# Patient Record
Sex: FEMALE | Race: ASIAN | NOT HISPANIC OR LATINO
[De-identification: names, ages, dates, MRNs, and addresses within clinical notes are randomized per-mention and may not be internally consistent; named-entity substitution may affect disease eponyms.]

---

## 2021-10-22 PROBLEM — Z00.00 ENCOUNTER FOR PREVENTIVE HEALTH EXAMINATION: Status: ACTIVE | Noted: 2021-10-22

## 2021-10-27 ENCOUNTER — NON-APPOINTMENT (OUTPATIENT)
Age: 46
End: 2021-10-27

## 2021-10-27 ENCOUNTER — APPOINTMENT (OUTPATIENT)
Dept: NEUROSURGERY | Facility: CLINIC | Age: 46
End: 2021-10-27
Payer: COMMERCIAL

## 2021-10-27 VITALS
HEIGHT: 63 IN | WEIGHT: 146 LBS | SYSTOLIC BLOOD PRESSURE: 149 MMHG | TEMPERATURE: 97.2 F | BODY MASS INDEX: 25.87 KG/M2 | HEART RATE: 69 BPM | DIASTOLIC BLOOD PRESSURE: 90 MMHG

## 2021-10-27 DIAGNOSIS — M54.12 RADICULOPATHY, CERVICAL REGION: ICD-10-CM

## 2021-10-27 PROCEDURE — 99205 OFFICE O/P NEW HI 60 MIN: CPT

## 2021-10-27 NOTE — ASSESSMENT
[FreeTextEntry1] : Ms. Marroquin has symptoms of cervical radiculopathy b/l however on imaging, shows no significant spinal cord stenosis that would require any acute neurosurgical intervention at this time. I provided a referral to Dr. Perry, if he recommends any further workup regarding her cervical radiculopathy.\par I recommended for the patient to continue pain management and physical therapy. She also has localized right shoulder pain and recommended for her to see an Orthopaedist if the pain is not relieved with supportive therapy. \par She can follow up with us as needed.\par The patient understands the plan of care and is in agreement.  All questions answered to patient satisfaction.\par \par

## 2021-10-27 NOTE — PHYSICAL EXAM
[General Appearance - Alert] : alert [General Appearance - In No Acute Distress] : in no acute distress [Oriented To Time, Place, And Person] : oriented to person, place, and time [Impaired Insight] : insight and judgment were intact [Affect] : the affect was normal [Person] : oriented to person [Place] : oriented to place [Time] : oriented to time [Short Term Intact] : short term memory intact [Remote Intact] : remote memory intact [Span Intact] : the attention span was normal [Concentration Intact] : normal concentrating ability [Fluency] : fluency intact [Comprehension] : comprehension intact [Current Events] : adequate knowledge of current events [Past History] : adequate knowledge of personal past history [Vocabulary] : adequate range of vocabulary [Cranial Nerves Optic (II)] : visual acuity intact bilaterally,  pupils equal round and reactive to light [Cranial Nerves Oculomotor (III)] : extraocular motion intact [Cranial Nerves Trigeminal (V)] : facial sensation intact symmetrically [Cranial Nerves Facial (VII)] : face symmetrical [Cranial Nerves Vestibulocochlear (VIII)] : hearing was intact bilaterally [Cranial Nerves Glossopharyngeal (IX)] : tongue and palate midline [Cranial Nerves Accessory (XI - Cranial And Spinal)] : head turning and shoulder shrug symmetric [Cranial Nerves Hypoglossal (XII)] : there was no tongue deviation with protrusion [No Muscle Atrophy] : normal bulk in all four extremities [Sensation Tactile Decrease] : light touch was intact [Abnormal Walk] : normal gait [Balance] : balance was intact [2+] : Patella left 2+ [3+] : Brachioradialis left 3+ [Past-pointing] : there was no past-pointing [Tremor] : no tremor present [FreeTextEntry6] : b/l UE 4/5 (pain limited), b/l HG 4/5, all other extremities full strength

## 2021-10-27 NOTE — END OF VISIT
[FreeTextEntry3] : I have seen the patient and reviewed the case together with PA and I agree with the final recommendations and plan of care.\par \par Yuan Marr MD\par Neurosurgery\par \par  [Time Spent: ___ minutes] : I have spent [unfilled] minutes of time on the encounter. [>50% of the face to face encounter time was spent on counseling and/or coordination of care for ___] : Greater than 50% of the face to face encounter time was spent on counseling and/or coordination of care for [unfilled]

## 2021-10-27 NOTE — HISTORY OF PRESENT ILLNESS
[de-identified] : 45 yo F with pmhx of DM Type II, HLD, presents to Neurosurgery clinic referred by self due to long standing neck pain. Patient reports having neck pain that radiates to bilateral hands associated with numbness and tingling x 4 years. It began as intermittent in nature however for the past year, it worsened to being constant throughout the day causing severe impact on her quality of life. She describes severe shoulder pain (worse on right), with numbness and tingling radiating from her neck to her arms bilaterally. The tingling and numbness is aggravated by moving her hands and arms however, the pain is most severe at night when she is lying down. She has been prescribed gabapentin in the past however it has not helped her pain. She also complains of chest pain which she recently underwent cardiac workup to see if all these symptoms are cardiac related, however workup thus far is negative. She was also diagnosed with fibromyalgia in the past after seeing a rheumatologist. Patient provided a MRI Thoracic Spine C- and MRI Cervical Spine C- both done in August 2021. The MRI Thoracic Spine was negative for any herniated disc or canal stenosis and the MRI Cervical spine showed mild multilevel degenerative changes with small disc bulge at level of C4-5, C5-6, and C6-7 however with NO associated central spinal cord stenosis. Patient would like to consider if there are any surgical options that may relieve her pain. 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 Denied alcohol, recreational drug use, and tobacco smoking.

## 2021-12-23 ENCOUNTER — APPOINTMENT (OUTPATIENT)
Dept: NEUROLOGY | Facility: CLINIC | Age: 46
End: 2021-12-23